# Patient Record
Sex: FEMALE | Employment: OTHER | URBAN - METROPOLITAN AREA
[De-identification: names, ages, dates, MRNs, and addresses within clinical notes are randomized per-mention and may not be internally consistent; named-entity substitution may affect disease eponyms.]

---

## 2018-06-26 ENCOUNTER — OFFICE VISIT (OUTPATIENT)
Dept: CARDIOLOGY CLINIC | Age: 63
End: 2018-06-26

## 2018-06-26 VITALS
OXYGEN SATURATION: 94 % | HEIGHT: 65 IN | SYSTOLIC BLOOD PRESSURE: 110 MMHG | WEIGHT: 244.6 LBS | DIASTOLIC BLOOD PRESSURE: 80 MMHG | BODY MASS INDEX: 40.75 KG/M2 | HEART RATE: 74 BPM

## 2018-06-26 DIAGNOSIS — E03.9 ACQUIRED HYPOTHYROIDISM: ICD-10-CM

## 2018-06-26 DIAGNOSIS — R00.2 PALPITATIONS: Primary | ICD-10-CM

## 2018-06-26 DIAGNOSIS — R01.1 SYSTOLIC MURMUR: ICD-10-CM

## 2018-06-26 DIAGNOSIS — R07.89 OTHER CHEST PAIN: ICD-10-CM

## 2018-06-26 DIAGNOSIS — I49.1 PAC (PREMATURE ATRIAL CONTRACTION): ICD-10-CM

## 2018-06-26 DIAGNOSIS — R73.03 PREDIABETES: ICD-10-CM

## 2018-06-26 DIAGNOSIS — E66.01 OBESITY, MORBID (HCC): ICD-10-CM

## 2018-06-26 RX ORDER — METFORMIN HYDROCHLORIDE 1000 MG/1
1000 TABLET ORAL DAILY
COMMUNITY

## 2018-06-26 RX ORDER — VALSARTAN AND HYDROCHLOROTHIAZIDE 160; 25 MG/1; MG/1
1 TABLET ORAL DAILY
COMMUNITY

## 2018-06-26 RX ORDER — LEVOTHYROXINE SODIUM 175 UG/1
175 TABLET ORAL
COMMUNITY

## 2018-06-26 NOTE — MR AVS SNAPSHOT
[address] [facility] [place] [address]7 [facility] 
[phone] Patient: Princess Carrillo MRN: [id number] Visit Information Date & Time Provider Department Dept. Phone Encounter #  
 6/26/2018  1:40 PM Harvey Restrepo MD [facility] Ciarra Marroquin [phone] [id number] Your Appointments 7/3/2018  2:00 PM  
STRESS ECHOCARDIOGRAMS with KAYLA VALLE  
[facility] (ELLIS SCHEDULING) Appt Note: 30 min stress test echo at 3 per dr Jodi Florence [address] [facility] [place] [address] [facility] [address]  
[phone]  
  
   
 [address] [facility] [place] [address] [place] [address]  
  
    
 7/3/2018  3:00 PM  
ECHO CARDIOGRAMS 2D with JAMES ABDALLA  
[facility] ([address]) Appt Note: 30 min stress test echo at 3 per dr Jodi Florence 320 [facility] [address] Upcoming Health Maintenance Date Due Hepatitis C Screening 1955 DTaP/Tdap/Td series (1 - Tdap) 3/29/1978 PAP AKA CERVICAL CYTOLOGY 3/29/1978 BREAST CANCER SCRN MAMMOGRAM 3/29/2007 FOBT Q 1 YEAR AGE 50-75 3/29/2007 ZOSTER VACCINE AGE 60> 1/29/2017 Influenza Age 5 to Adult 8/1/2018 Allergies as of 6/26/2018  Never Reviewed No Known Allergies Current Immunizations  Never Reviewed No immunizations on file. Not reviewed this visit Vitals BP Pulse Height(growth percentile) Weight(growth percentile) SpO2 BMI  
 110/80 (BP 1 Location: Right arm, BP Patient Position: Sitting) 74 5' 5\" (1.651 m) 244 lb 9.6 oz (110.9 kg) 94% 40.7 kg/m2 Smoking Status Never Smoker Vitals History BMI and BSA Data Body Mass Index Body Surface Area 40.7 kg/m 2 2.26 m 2 Preferred Pharmacy Pharmacy Name Phone [facility] [address] [place] [address], [facility] Dave Borrero [phone] Your Updated Medication List  
  
   
This list is accurate as of 6/26/18  3:00 PM.  Always use your most recent med list.  
  
  
  
  
 levothyroxine 175 mcg tablet Commonly known as:  SYNTHROID Take 175 mcg by mouth Daily (before breakfast). Liraglutide 0.6 mg/0.1 mL (18 mg/3 mL) Pnij Commonly known as:  VICTOZA  
0.6 mg by SubCUTAneous route. metFORMIN 1,000 mg tablet Commonly known as:  GLUCOPHAGE Take 1,000 mg by mouth daily. METOPROLOL SUCCINATE PO Take  by mouth daily. valsartan-hydroCHLOROthiazide 160-25 mg per tablet Commonly known as:  DIOVAN-HCT Take 1 Tab by mouth daily. Introducing [facility] SERVICES! [facility] introduces [employer] patient portal. Now you can access parts of your medical record, email your doctor's office, and request medication refills online. 1. In your internet browser, go to [email or web address] 2. Click on the First Time User? Click Here link in the Sign In box. You will see the New Member Sign Up page. 3. Enter your [employer] Access Code exactly as it appears below. You will not need to use this code after youve completed the sign-up process. If you do not sign up before the expiration date, you must request a new code. · [employer] Access Code: [email or web address] Expires: 9/24/2018  2:55 PM 
 
4. Enter the last four digits of your Social Security Number (xxxx) and Date of Birth (mm/dd/yyyy) as indicated and click Submit. You will be taken to the next sign-up page. 5. Create a [employer] ID. This will be your [employer] login ID and cannot be changed, so think of one that is secure and easy to remember. 6. Create a [employer] password. You can change your password at any time. 7. Enter your Password Reset Question and Answer.  This can be used at a later time if you forget your password. 8. Enter your e-mail address. You will receive e-mail notification when new information is available in [address] 9. Click Sign Up. You can now view and download portions of your medical record. 10. Click the Download Summary menu link to download a portable copy of your medical information. If you have questions, please visit the Frequently Asked Questions section of the [employer] website. Remember, [employer] is NOT to be used for urgent needs. For medical emergencies, dial 911. Now available from your iPhone and Android! Please provide this summary of care documentation to your next provider. Your primary care clinician is listed as Linette Rivera. If you have any questions after today's visit, please call [phone].

## 2018-06-26 NOTE — PROGRESS NOTES
[address]  [address], [address] [place]  [place], [address] [facility]    Office [phone]  Fax [phone]  Cell [phone]        Chris Valderrama is a 66 y.o. female referred for evaluation of chest pain/palpitations. Consult requested by Chasidy Sexton MD.      Assessment  Encounter Diagnoses   Name Primary?  Prediabetes     Acquired hypothyroidism     Obesity, morbid ([address]     Palpitations Yes    PAC (premature atrial contraction)        Recommendations:    Symptomatic PAC's. She has considerable stress, although has significant insight and works hard to manage her stress with Taekwondo and deep breathing. Her exam is significant for systolic murmur, likely aortic valve sclerosis, possible mild stenosis. She has no exertional symptoms other than fatigue. Chest pain seems atypical for angina. Will evaluate further with echo and ETT. Continue low dose Toprol for now. Management of supraventricular ectopy can be tough. Obesity    HTN, controlled on combination therapy. Prediabetes on Metformin/Victoza. Phone follow up after reviewing tests    Follow-up Disposition: Not on File     Subjective:  Ms. Jazmyne Medrano has mild hypertension, prediabetes and hypothyroidism followed by Dr. Kati Vogel. She has no personal or family cardiac history. Stress test in 2009 by Dr. Carmen Wolf was apparently normal.     Patient reports a one year history if significant increased stress. She states her aunt, who she is very close to, was found to have colon cancer. She has been taking care of her parents and aunt and her daughter, who is recently pregnant and previously miscarried. Patient tries deep breathing and speaking to friends to manage symptoms. She states she still enjoys activities, but is distressed about her parents dying.      Ms. Emmit Spatz states over the past few months she has been experiencing intermittent chest pain and palpitations, which occur at rest and occasionally wake her from her sleep. She states palpitations are characterized by \"heart fluttering\" that radiates up her throat and cause her to cough. She also has significant fatigue. Patient was started on Toprol on 6/22/18. She states she continued to experience some palpitations last night. Pt does Taekwondo 3x/week, with no symptoms. She does not walk for exercise. 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     Ms. Chris Brooks drinks <8 oz of caffeine per day. She drinks alcohol ~1x/year. Patient denies syncope, edema or paroxysmal nocturnal dyspnea. She denies a history of GERD, but has IBS. She previously used Adderall for ADHD, but is not using any currently. She uses fish oil, tumeric and red rice supplements. Cardiac risk factors   HTN yes  DM no  Family Hx: none    Cardiac testing  EKG 6/22/18 - SR, with intermittent PACs    No past medical history on file. Allergies no known allergies       Review of Systems  Constitutional: Negative for fever, chills, and diaphoresis. +Fatigue  Respiratory: Negative for cough, hemoptysis, sputum production, shortness of breath and wheezing. Cardiovascular: Negative for chest pain, orthopnea, claudication, leg swelling and PND. +palpitations  Gastrointestinal: Negative for heartburn, nausea, vomiting, blood in stool and melena. Genitourinary: Negative for dysuria and flank pain. Musculoskeletal: Negative for joint pain and back pain. Skin: Negative for rash. Neurological: Negative for focal weakness, seizures, loss of consciousness, weakness and headaches. Endo/Heme/Allergies: Does not bruise/bleed easily. Psychiatric/Behavioral: Negative for memory loss.  The patient does not have insomnia. +stress      Physical Exam    Visit Vitals    /80 (BP 1 Location: Right arm, BP Patient Position: Sitting)    Pulse 74    Ht 5' 5\" (1.651 m)    Wt 244 lb 9.6 oz (110.9 kg)    SpO2 94%    BMI 40.7 kg/m2     Wt Readings from Last 3 Encounters:   06/26/18 244 lb 9.6 oz (110.9 kg)      General - well developed well nourished  Neck - JVP normal, thyroid nl  Cardiac - normal S1, S2, rubs or gallops. No clicks. Grade 2 systolic ejection Murmur. Vascular - carotids without bruits, radials, femorals and pedal pulses equal bilateral  Lungs - clear to auscultation bilaterals, no rales, wheezing or rhonchi  Abd - soft nontender, no HSM, no abd bruits  Extremities - no edema  Skin - no rash  Neuro - nonfocal  Psych - normal mood and affect      Cardiographics  EKG 6/22/18 - SR, with intermittent PACs    Written by Aman Cannon, as dictated by Dr. Carmela Zambrano.    Carmela Zambrano MD

## 2018-06-26 NOTE — PROGRESS NOTES
Visit Vitals    /80 (BP 1 Location: Right arm, BP Patient Position: Sitting)    Pulse 74    Ht 5' 5\" (1.651 m)    Wt 244 lb 9.6 oz (110.9 kg)    SpO2 94%    BMI 40.7 kg/m2

## 2018-06-30 PROBLEM — I49.1 PAC (PREMATURE ATRIAL CONTRACTION): Status: ACTIVE | Noted: 2018-06-30

## 2018-06-30 PROBLEM — E66.01 OBESITY, MORBID (HCC): Status: ACTIVE | Noted: 2018-06-30

## 2018-06-30 PROBLEM — R00.2 PALPITATIONS: Status: ACTIVE | Noted: 2018-06-30

## 2018-07-03 ENCOUNTER — CLINICAL SUPPORT (OUTPATIENT)
Dept: CARDIOLOGY CLINIC | Age: 63
End: 2018-07-03

## 2018-07-03 DIAGNOSIS — R00.2 PALPITATIONS: ICD-10-CM

## 2018-07-03 DIAGNOSIS — E66.01 OBESITY, MORBID (HCC): ICD-10-CM

## 2018-07-03 DIAGNOSIS — R00.2 PALPITATIONS: Primary | ICD-10-CM

## 2018-07-03 DIAGNOSIS — I49.1 PAC (PREMATURE ATRIAL CONTRACTION): ICD-10-CM

## 2018-07-03 DIAGNOSIS — R07.89 OTHER CHEST PAIN: ICD-10-CM

## 2018-07-03 NOTE — PROGRESS NOTES
Explained procedure to patient, monitoring EKG/HR/BP,  walking on treadmill, and risks/discomforts. All concerns and questions addressed. Consent obtained. See scanned report. Dr. Sandria Moritz ordered and Dr. Sandria Moritz read study. ID verified per protocol. Pt reported no symptoms after completion of protocol.

## 2018-07-08 ENCOUNTER — TELEPHONE (OUTPATIENT)
Dept: CARDIOLOGY CLINIC | Age: 63
End: 2018-07-08

## 2018-07-08 NOTE — TELEPHONE ENCOUNTER
Reviewed ETT and echo:    ETT normal  Echo with likely lipomatous hypertrophy of atrial septum extending into superior RA wall. No significant valvular disease noted. Normal EF    Benign but symptomatic PACs  Likely lipomatous hypertrophy    Would like to get cardiac MRI to confirm findings. Given her BMI, will need larger bore tube to accommodate - [facility] [place].  If cardiac package not available there, then plan B is LISA      Lizbeth Braun MD

## 2018-07-10 DIAGNOSIS — I49.1 PAC (PREMATURE ATRIAL CONTRACTION): Primary | ICD-10-CM

## 2018-07-10 DIAGNOSIS — E66.01 OBESITY, MORBID (HCC): ICD-10-CM

## 2018-07-10 NOTE — PROGRESS NOTES
Cardiac MRI ordered VO DR. Josee Walsh. Forms faxed to [facility] for scheduling.

## 2018-08-31 DIAGNOSIS — D17.9 BENIGN LIPOMATOUS NEOPLASM: Primary | ICD-10-CM

## 2018-08-31 DIAGNOSIS — I49.1 ATRIAL PREMATURE BEATS: ICD-10-CM

## 2018-08-31 NOTE — PROGRESS NOTES
Cardiac MRI re-ordered for pt per VO of Dr. Eliane Woodward. Office note and abnormal testing faxed.

## 2018-09-24 ENCOUNTER — TELEPHONE (OUTPATIENT)
Dept: CARDIOLOGY CLINIC | Age: 63
End: 2018-09-24

## 2018-09-24 NOTE — TELEPHONE ENCOUNTER
Pt called asking that her records be sent to [facility] [place] [facility]. Pt did not have a fax number, phone: [phone].      Thanks

## 2022-03-19 PROBLEM — E66.01 OBESITY, MORBID (HCC): Status: ACTIVE | Noted: 2018-06-30

## 2022-03-19 PROBLEM — I49.1 PAC (PREMATURE ATRIAL CONTRACTION): Status: ACTIVE | Noted: 2018-06-30

## 2022-03-20 PROBLEM — R00.2 PALPITATIONS: Status: ACTIVE | Noted: 2018-06-30

## 2023-05-18 RX ORDER — LEVOTHYROXINE SODIUM 175 UG/1
175 TABLET ORAL
COMMUNITY

## 2023-05-18 RX ORDER — VALSARTAN AND HYDROCHLOROTHIAZIDE 160; 25 MG/1; MG/1
1 TABLET ORAL DAILY
COMMUNITY

## 2023-07-07 ENCOUNTER — HOSPITAL ENCOUNTER (EMERGENCY)
Facility: HOSPITAL | Age: 68
Discharge: HOME | End: 2023-07-07
Attending: EMERGENCY MEDICINE
Payer: MEDICARE

## 2023-07-07 VITALS
BODY MASS INDEX: 44.98 KG/M2 | SYSTOLIC BLOOD PRESSURE: 123 MMHG | RESPIRATION RATE: 22 BRPM | HEIGHT: 65 IN | TEMPERATURE: 98.3 F | OXYGEN SATURATION: 95 % | WEIGHT: 270 LBS | HEART RATE: 76 BPM | DIASTOLIC BLOOD PRESSURE: 69 MMHG

## 2023-07-07 DIAGNOSIS — T63.441A BEE STING REACTION, ACCIDENTAL OR UNINTENTIONAL, INITIAL ENCOUNTER: ICD-10-CM

## 2023-07-07 DIAGNOSIS — T78.40XA ALLERGIC REACTION, INITIAL ENCOUNTER: Primary | ICD-10-CM

## 2023-07-07 PROCEDURE — 6360000002 HC RX W HCPCS: Performed by: EMERGENCY MEDICINE

## 2023-07-07 PROCEDURE — 2580000003 HC RX 258: Performed by: EMERGENCY MEDICINE

## 2023-07-07 PROCEDURE — 96375 TX/PRO/DX INJ NEW DRUG ADDON: CPT

## 2023-07-07 PROCEDURE — 96374 THER/PROPH/DIAG INJ IV PUSH: CPT

## 2023-07-07 PROCEDURE — 99284 EMERGENCY DEPT VISIT MOD MDM: CPT

## 2023-07-07 PROCEDURE — 2500000003 HC RX 250 WO HCPCS: Performed by: EMERGENCY MEDICINE

## 2023-07-07 PROCEDURE — 6360000002 HC RX W HCPCS

## 2023-07-07 PROCEDURE — A4216 STERILE WATER/SALINE, 10 ML: HCPCS | Performed by: EMERGENCY MEDICINE

## 2023-07-07 RX ORDER — DIPHENHYDRAMINE HYDROCHLORIDE 50 MG/ML
50 INJECTION INTRAMUSCULAR; INTRAVENOUS ONCE
Status: COMPLETED | OUTPATIENT
Start: 2023-07-07 | End: 2023-07-07

## 2023-07-07 RX ORDER — FAMOTIDINE 20 MG/1
20 TABLET, FILM COATED ORAL 2 TIMES DAILY
Qty: 20 TABLET | Refills: 0 | Status: SHIPPED | OUTPATIENT
Start: 2023-07-07 | End: 2023-07-17

## 2023-07-07 RX ORDER — METHYLPREDNISOLONE 4 MG/1
TABLET ORAL
Qty: 1 KIT | Refills: 0 | Status: SHIPPED | OUTPATIENT
Start: 2023-07-07 | End: 2023-07-13

## 2023-07-07 RX ORDER — KETOROLAC TROMETHAMINE 30 MG/ML
INJECTION, SOLUTION INTRAMUSCULAR; INTRAVENOUS
Status: COMPLETED
Start: 2023-07-07 | End: 2023-07-07

## 2023-07-07 RX ORDER — NAPROXEN 500 MG/1
500 TABLET ORAL 2 TIMES DAILY PRN
Qty: 30 TABLET | Refills: 1 | Status: SHIPPED | OUTPATIENT
Start: 2023-07-07

## 2023-07-07 RX ORDER — KETOROLAC TROMETHAMINE 15 MG/ML
30 INJECTION, SOLUTION INTRAMUSCULAR; INTRAVENOUS
Status: DISCONTINUED | OUTPATIENT
Start: 2023-07-07 | End: 2023-07-07 | Stop reason: HOSPADM

## 2023-07-07 RX ORDER — DIPHENHYDRAMINE HCL 25 MG
50 CAPSULE ORAL EVERY 6 HOURS PRN
Qty: 30 CAPSULE | Refills: 0 | Status: SHIPPED | OUTPATIENT
Start: 2023-07-07 | End: 2023-07-17

## 2023-07-07 RX ADMIN — METHYLPREDNISOLONE SODIUM SUCCINATE 125 MG: 125 INJECTION, POWDER, FOR SOLUTION INTRAMUSCULAR; INTRAVENOUS at 20:13

## 2023-07-07 RX ADMIN — DIPHENHYDRAMINE HYDROCHLORIDE 50 MG: 50 INJECTION, SOLUTION INTRAMUSCULAR; INTRAVENOUS at 20:13

## 2023-07-07 RX ADMIN — FAMOTIDINE 20 MG: 10 INJECTION, SOLUTION INTRAVENOUS at 20:13

## 2023-07-07 RX ADMIN — KETOROLAC TROMETHAMINE 30 MG: 30 INJECTION, SOLUTION INTRAMUSCULAR; INTRAVENOUS at 20:30

## 2023-07-08 NOTE — ED TRIAGE NOTES
Pt complaining of multi bee stings while mowing the lawn this evening. Pt able to speak in full sentences.  Multi red areas to back arms and legs

## 2023-07-08 NOTE — ED TRIAGE NOTES
[facility] EMERGENCY DEPT  EMERGENCY DEPARTMENT ENCOUNTER      Pt Name: Negrito Calloway  MRN: [id number]  [address] [facility] 1955  Date of evaluation: 7/7/2023  Provider: Jair Schmitt MD    CHIEF COMPLAINT       Chief Complaint   Patient presents with    Allergic Reaction     Bee sting         HISTORY OF PRESENT ILLNESS   (Location/Symptom, Timing/Onset, Context/Setting, Quality, Duration, Modifying Factors, Severity)  Note limiting factors. 80-year-old female who presents to the ER by EMS for evaluation after she was bitten by several bees this evening after she ran over a hive while doing yard work this evening. She is complaining of itching, burning pain to the left side of face, abdominal area and leg. She denies any headache, fever and chills, sore throat, cough or congestion, neck and back pain, chest pain or shortness of breath with exertion, vomiting, diarrhea, constipation, dysuria, dizziness, extremity weakness or numbness, sick contact, recent travel, previous history of the same. Review of External Medical Records:     Nursing Notes were reviewed. REVIEW OF SYSTEMS    (2-9 systems for level 4, 10 or more for level 5)     Review of Systems   All other systems reviewed and are negative. Except as noted above the remainder of the review of systems was reviewed and negative. PAST MEDICAL HISTORY   No past medical history on file. SURGICAL HISTORY     No past surgical history on file.       CURRENT MEDICATIONS       Discharge Medication List as of 7/7/2023  9:30 PM        CONTINUE these medications which have NOT CHANGED    Details   METOPROLOL SUCCINATE PO Take by mouth dailyHistorical Med      levothyroxine (SYNTHROID) 175 MCG tablet Take 1 tablet by mouth every morning (before breakfast)Historical Med      Liraglutide (VICTOZA) 18 MG/3ML SOPN SC injection Inject 0.6 mg into the skinHistorical Med      metFORMIN (GLUCOPHAGE) 1000 MG tablet Take 1 tablet by mouth dailyHistorical Med

## 2024-11-01 ENCOUNTER — TRANSCRIBE ORDERS (OUTPATIENT)
Facility: HOSPITAL | Age: 69
End: 2024-11-01

## 2024-11-01 DIAGNOSIS — R51.9 NEW ONSET OF HEADACHES AFTER AGE 50: Primary | ICD-10-CM
